# Patient Record
(demographics unavailable — no encounter records)

---

## 2025-02-17 NOTE — PHYSICAL EXAM
[General Appearance - Alert] : alert [General Appearance - In No Acute Distress] : in no acute distress [Motor Strength] : muscle strength was normal in all four extremities [Sensation Tactile Decrease] : light touch was intact [2+] : Patella left 2+

## 2025-02-18 NOTE — HISTORY OF PRESENT ILLNESS
[de-identified] : ISH ELIAS is a 41 year female with a PMH of   who presents to the office today at the request of Dr. Anderson Harmon for neurosurgical consultation due to cervical radiculopathy. The patient reports right sided neck pain that is characterized as burning, tingling and constant in nature and 10/10 in intensity.  It started about 6 months ago. It is exacerbated by moving her neck and lifting up her right arm and relieved by cold packs.  It radiates to the right arm. There has been no known trauma precipitating the pain.  The patient denies numbness of extremities, weakness of extremities, bowel or bladder incontinence and gait disturbance.  She has undergone imaging in the form of MRI C spine which I have independently reviewed today, and which revealed small herniated disc at C5/6, worse on the left.   She has tried ALEJANDRO, trigger point injections, PT, chiropractic manipulation and pain medications including cyclobenzaprine, in the last 6 months.  Most recently she has gotten some relief from PT and trigger point injections and acupuncture.   12/5/24 EMG normal.  Surg Hx: none Meds: Wegovy, nurtec, fexofenadine, phentermine, famotidine, chlorzoxazone Allergies: NKDA Soc Hx: never smoker, no EtOH, has 3 year old son, works as

## 2025-02-18 NOTE — END OF VISIT
[FreeTextEntry3] : I have seen the patient and reviewed the case together with PA and I agree with the final recommendations and plan of care.  Saulo Cruz MD Neurosurgery  [Time Spent: ___ minutes] : I have spent [unfilled] minutes of time on the encounter which excludes teaching and separately reported services.

## 2025-02-18 NOTE — HISTORY OF PRESENT ILLNESS
[de-identified] : ISH ELIAS is a 41 year female with a PMH of   who presents to the office today at the request of Dr. Anderson Harmon for neurosurgical consultation due to cervical radiculopathy. The patient reports right sided neck pain that is characterized as burning, tingling and constant in nature and 10/10 in intensity.  It started about 6 months ago. It is exacerbated by moving her neck and lifting up her right arm and relieved by cold packs.  It radiates to the right arm. There has been no known trauma precipitating the pain.  The patient denies numbness of extremities, weakness of extremities, bowel or bladder incontinence and gait disturbance.  She has undergone imaging in the form of MRI C spine which I have independently reviewed today, and which revealed small herniated disc at C5/6, worse on the left.   She has tried ALEJANDRO, trigger point injections, PT, chiropractic manipulation and pain medications including cyclobenzaprine, in the last 6 months.  Most recently she has gotten some relief from PT and trigger point injections and acupuncture.   12/5/24 EMG normal.  Surg Hx: none Meds: Wegovy, nurtec, fexofenadine, phentermine, famotidine, chlorzoxazone Allergies: NKDA Soc Hx: never smoker, no EtOH, has 3 year old son, works as

## 2025-02-18 NOTE — ASSESSMENT
[FreeTextEntry1] : I have discussed the natural history and treatment options for cervical myelopathy with the patient. I explained the indications for observation, conservative management, medical management, physical therapy, pain management approaches and surgery. I explained the different types and surgical approaches including anterior and posterior approaches as well as decompression only procedures and instrumented fusions. I discussed the risks, benefits, possible complications and expected outcome related to each treatment option.   In the end, I recommend continued physical therapy and acupuncture, as she is getting some relief.  She should return to the office in 3 months for a progress check.  The patient understands the plan of care and is in agreement.  All questions answered to patient satisfaction.

## 2025-05-12 NOTE — ASSESSMENT
[FreeTextEntry1] : I have discussed the natural history and treatment options for cervical radiculopathy with the patient. I explained the indications for observation, conservative management, medical management, physical therapy, pain management approaches and surgery. I explained the different types and surgical approaches including anterior and posterior approaches as well as decompression only procedures and instrumented fusions. I discussed the risks, benefits, possible complications and expected outcome related to each treatment option.   In the end, I recommend continued physical therapy and acupuncture, as she is getting some relief.  She should return to the office as needed at this point.  The patient understands the plan of care and is in agreement.  All questions answered to patient satisfaction.

## 2025-05-12 NOTE — HISTORY OF PRESENT ILLNESS
[FreeTextEntry1] : Ms. Lehman returns to the office today for interval follow up progress check for her cervical radiculopathy.  She has been participating in physical therapy and acupuncture as recommended with good improvement in her pain symptoms.   2/18/25: ISH LEHMAN is a 41 year female with a PMH of obesity, migraines who presents to the office today at the request of Dr. Anderson Harmon for neurosurgical consultation due to cervical radiculopathy. The patient reports right sided neck pain that is characterized as burning, tingling and constant in nature and 10/10 in intensity.  It started about 6 months ago. It is exacerbated by moving her neck and lifting up her right arm and relieved by cold packs.  It radiates to the right arm. There has been no known trauma precipitating the pain.  The patient denies numbness of extremities, weakness of extremities, bowel or bladder incontinence and gait disturbance.  She has undergone imaging in the form of MRI C spine which I have independently reviewed today, and which revealed small herniated disc at C5/6, worse on the left.   She has tried ALEJANDRO, trigger point injections, PT, chiropractic manipulation and pain medications including cyclobenzaprine, in the last 6 months.  Most recently she has gotten some relief from PT and trigger point injections and acupuncture.   12/5/24 EMG normal.  Surg Hx: none Meds: Wegovy, nurtec, fexofenadine, phentermine, famotidine, chlorzoxazone Allergies: NKDA Soc Hx: never smoker, no EtOH, has 3 year old son, works as